# Patient Record
Sex: FEMALE
[De-identification: names, ages, dates, MRNs, and addresses within clinical notes are randomized per-mention and may not be internally consistent; named-entity substitution may affect disease eponyms.]

---

## 2019-05-02 ENCOUNTER — RX RENEWAL (OUTPATIENT)
Age: 29
End: 2019-05-02

## 2021-04-23 ENCOUNTER — EMERGENCY (EMERGENCY)
Facility: HOSPITAL | Age: 31
LOS: 1 days | Discharge: HOME | End: 2021-04-23
Attending: EMERGENCY MEDICINE | Admitting: EMERGENCY MEDICINE
Payer: COMMERCIAL

## 2021-04-23 VITALS
OXYGEN SATURATION: 99 % | DIASTOLIC BLOOD PRESSURE: 68 MMHG | RESPIRATION RATE: 16 BRPM | TEMPERATURE: 98 F | HEART RATE: 80 BPM | SYSTOLIC BLOOD PRESSURE: 110 MMHG

## 2021-04-23 VITALS
HEART RATE: 98 BPM | DIASTOLIC BLOOD PRESSURE: 74 MMHG | SYSTOLIC BLOOD PRESSURE: 103 MMHG | RESPIRATION RATE: 16 BRPM | WEIGHT: 130.07 LBS | OXYGEN SATURATION: 98 % | TEMPERATURE: 98 F

## 2021-04-23 DIAGNOSIS — K59.00 CONSTIPATION, UNSPECIFIED: ICD-10-CM

## 2021-04-23 PROCEDURE — 99284 EMERGENCY DEPT VISIT MOD MDM: CPT

## 2021-04-23 RX ORDER — MULTIVIT WITH MIN/MFOLATE/K2 340-15/3 G
1 POWDER (GRAM) ORAL ONCE
Refills: 0 | Status: COMPLETED | OUTPATIENT
Start: 2021-04-23 | End: 2021-04-23

## 2021-04-23 RX ORDER — LIDOCAINE 4 G/100G
1 CREAM TOPICAL
Qty: 1 | Refills: 0
Start: 2021-04-23 | End: 2021-04-29

## 2021-04-23 RX ADMIN — Medication 1 BOTTLE: at 08:37

## 2021-04-23 RX ADMIN — Medication 1 ENEMA: at 08:37

## 2021-04-23 NOTE — ED PROVIDER NOTE - OBJECTIVE STATEMENT
31 y/o female with no pertinent PMHx who has been having small, hard BM for the past few days. No BM for the past 24 hours with increased "fullness" in rectum and lower abdomen with intermittent cramping. LNMP several days ago. No urinary symptoms. Patient started taking Docusate and Senna with no improvements. Has happened in the past. Denies N/V, fever or chills.

## 2021-04-23 NOTE — ED PROVIDER NOTE - PATIENT PORTAL LINK FT
You can access the FollowMyHealth Patient Portal offered by [facility] by registering at the following website: [email or web address]. By joining [employer]’s FollowMyHealth portal, you will also be able to view your health information using other applications (apps) compatible with our system.

## 2021-04-23 NOTE — ED ADULT TRIAGE NOTE - CHIEF COMPLAINT QUOTE
Pt hasn't had "normal" stool for 3-4 days , been using laxatives but only water coming out, today has pain in rectum and lower left quadrant pain , hx of same years ago. nil other pmhx, nil meds, LMp yesterday

## 2021-04-23 NOTE — ED ADULT NURSE NOTE - OBJECTIVE STATEMENT
Pt states "Spring been constipated for the past 2-3 days and my doctor told me to come ER". Pt added "I took a laxative last night and no feel cramping".

## 2021-04-23 NOTE — ED PROVIDER NOTE - CLINICAL SUMMARY MEDICAL DECISION MAKING FREE TEXT BOX
+ constipation, do not suspect acute abdomen. Given meds, pt preferred to have BM at home. Advised to return if interventions are unsuccessful.

## 2021-04-23 NOTE — ED PROVIDER NOTE - PHYSICAL EXAMINATION
VITAL SIGNS: I have reviewed nursing notes and confirm.  CONSTITUTIONAL: Well-developed; well-nourished; in no acute distress.  SKIN: Skin is warm and dry, no acute rash.  HEAD: Normocephalic; atraumatic.  EYES: PERRL, EOM intact; conjunctiva and sclera clear.  ENT: No nasal discharge; airway clear.  NECK: Supple; non tender.  CARD: S1, S2 normal; no murmurs, gallops, or rubs. Regular rate and rhythm.  RESP: No wheezes, rales or rhonchi.  ABD: +Palpable stool in LLQ. Nontender.   EXT: Normal ROM. No clubbing, cyanosis or edema.  NEURO: Alert, oriented. Grossly unremarkable.  PSYCH: Cooperative, appropriate.

## 2021-08-11 ENCOUNTER — EMERGENCY (EMERGENCY)
Facility: HOSPITAL | Age: 31
LOS: 1 days | Discharge: HOME | End: 2021-08-11
Attending: EMERGENCY MEDICINE | Admitting: EMERGENCY MEDICINE
Payer: COMMERCIAL

## 2021-08-11 VITALS
HEIGHT: 64 IN | DIASTOLIC BLOOD PRESSURE: 70 MMHG | SYSTOLIC BLOOD PRESSURE: 106 MMHG | RESPIRATION RATE: 18 BRPM | OXYGEN SATURATION: 98 % | WEIGHT: 110.01 LBS | HEART RATE: 87 BPM | TEMPERATURE: 98 F

## 2021-08-11 VITALS
OXYGEN SATURATION: 98 % | SYSTOLIC BLOOD PRESSURE: 108 MMHG | RESPIRATION RATE: 18 BRPM | HEART RATE: 84 BPM | TEMPERATURE: 98 F | DIASTOLIC BLOOD PRESSURE: 76 MMHG

## 2021-08-11 DIAGNOSIS — N83.201 UNSPECIFIED OVARIAN CYST, RIGHT SIDE: ICD-10-CM

## 2021-08-11 DIAGNOSIS — R10.31 RIGHT LOWER QUADRANT PAIN: ICD-10-CM

## 2021-08-11 LAB
ALBUMIN SERPL ELPH-MCNC: 3.9 G/DL — SIGNIFICANT CHANGE UP (ref 3.4–5)
ALP SERPL-CCNC: 39 U/L — LOW (ref 40–120)
ALT FLD-CCNC: 18 U/L — SIGNIFICANT CHANGE UP (ref 12–42)
ANION GAP SERPL CALC-SCNC: 4 MMOL/L — LOW (ref 9–16)
AST SERPL-CCNC: 16 U/L — SIGNIFICANT CHANGE UP (ref 15–37)
BASOPHILS # BLD AUTO: 0.06 K/UL — SIGNIFICANT CHANGE UP (ref 0–0.2)
BASOPHILS NFR BLD AUTO: 0.8 % — SIGNIFICANT CHANGE UP (ref 0–2)
BILIRUB SERPL-MCNC: 0.2 MG/DL — SIGNIFICANT CHANGE UP (ref 0.2–1.2)
BUN SERPL-MCNC: 8 MG/DL — SIGNIFICANT CHANGE UP (ref 7–23)
CALCIUM SERPL-MCNC: 9 MG/DL — SIGNIFICANT CHANGE UP (ref 8.5–10.5)
CHLORIDE SERPL-SCNC: 109 MMOL/L — HIGH (ref 96–108)
CO2 SERPL-SCNC: 30 MMOL/L — SIGNIFICANT CHANGE UP (ref 22–31)
CREAT SERPL-MCNC: 0.95 MG/DL — SIGNIFICANT CHANGE UP (ref 0.5–1.3)
EOSINOPHIL # BLD AUTO: 0.17 K/UL — SIGNIFICANT CHANGE UP (ref 0–0.5)
EOSINOPHIL NFR BLD AUTO: 2.3 % — SIGNIFICANT CHANGE UP (ref 0–6)
GLUCOSE SERPL-MCNC: 90 MG/DL — SIGNIFICANT CHANGE UP (ref 70–99)
HCG SERPL-ACNC: 1 MIU/ML — SIGNIFICANT CHANGE UP
HCT VFR BLD CALC: 39.9 % — SIGNIFICANT CHANGE UP (ref 34.5–45)
HGB BLD-MCNC: 13.4 G/DL — SIGNIFICANT CHANGE UP (ref 11.5–15.5)
IMM GRANULOCYTES NFR BLD AUTO: 0.4 % — SIGNIFICANT CHANGE UP (ref 0–1.5)
LIDOCAIN IGE QN: 181 U/L — SIGNIFICANT CHANGE UP (ref 73–393)
LYMPHOCYTES # BLD AUTO: 2.43 K/UL — SIGNIFICANT CHANGE UP (ref 1–3.3)
LYMPHOCYTES # BLD AUTO: 33 % — SIGNIFICANT CHANGE UP (ref 13–44)
MCHC RBC-ENTMCNC: 32 PG — SIGNIFICANT CHANGE UP (ref 27–34)
MCHC RBC-ENTMCNC: 33.6 GM/DL — SIGNIFICANT CHANGE UP (ref 32–36)
MCV RBC AUTO: 95.2 FL — SIGNIFICANT CHANGE UP (ref 80–100)
MONOCYTES # BLD AUTO: 0.56 K/UL — SIGNIFICANT CHANGE UP (ref 0–0.9)
MONOCYTES NFR BLD AUTO: 7.6 % — SIGNIFICANT CHANGE UP (ref 2–14)
NEUTROPHILS # BLD AUTO: 4.11 K/UL — SIGNIFICANT CHANGE UP (ref 1.8–7.4)
NEUTROPHILS NFR BLD AUTO: 55.9 % — SIGNIFICANT CHANGE UP (ref 43–77)
NRBC # BLD: 0 /100 WBCS — SIGNIFICANT CHANGE UP (ref 0–0)
PLATELET # BLD AUTO: 227 K/UL — SIGNIFICANT CHANGE UP (ref 150–400)
POTASSIUM SERPL-MCNC: 4.3 MMOL/L — SIGNIFICANT CHANGE UP (ref 3.5–5.3)
POTASSIUM SERPL-SCNC: 4.3 MMOL/L — SIGNIFICANT CHANGE UP (ref 3.5–5.3)
PROT SERPL-MCNC: 7.3 G/DL — SIGNIFICANT CHANGE UP (ref 6.4–8.2)
RBC # BLD: 4.19 M/UL — SIGNIFICANT CHANGE UP (ref 3.8–5.2)
RBC # FLD: 12.2 % — SIGNIFICANT CHANGE UP (ref 10.3–14.5)
SODIUM SERPL-SCNC: 143 MMOL/L — SIGNIFICANT CHANGE UP (ref 132–145)
WBC # BLD: 7.36 K/UL — SIGNIFICANT CHANGE UP (ref 3.8–10.5)
WBC # FLD AUTO: 7.36 K/UL — SIGNIFICANT CHANGE UP (ref 3.8–10.5)

## 2021-08-11 PROCEDURE — 76856 US EXAM PELVIC COMPLETE: CPT | Mod: 26

## 2021-08-11 PROCEDURE — 76830 TRANSVAGINAL US NON-OB: CPT | Mod: 26

## 2021-08-11 PROCEDURE — 99285 EMERGENCY DEPT VISIT HI MDM: CPT

## 2021-08-11 RX ORDER — ONDANSETRON 8 MG/1
4 TABLET, FILM COATED ORAL ONCE
Refills: 0 | Status: COMPLETED | OUTPATIENT
Start: 2021-08-11 | End: 2021-08-11

## 2021-08-11 RX ORDER — FAMOTIDINE 10 MG/ML
20 INJECTION INTRAVENOUS ONCE
Refills: 0 | Status: COMPLETED | OUTPATIENT
Start: 2021-08-11 | End: 2021-08-11

## 2021-08-11 RX ORDER — IOHEXOL 300 MG/ML
30 INJECTION, SOLUTION INTRAVENOUS ONCE
Refills: 0 | Status: COMPLETED | OUTPATIENT
Start: 2021-08-11 | End: 2021-08-11

## 2021-08-11 RX ORDER — SODIUM CHLORIDE 9 MG/ML
1000 INJECTION INTRAMUSCULAR; INTRAVENOUS; SUBCUTANEOUS ONCE
Refills: 0 | Status: COMPLETED | OUTPATIENT
Start: 2021-08-11 | End: 2021-08-11

## 2021-08-11 RX ORDER — KETOROLAC TROMETHAMINE 30 MG/ML
15 SYRINGE (ML) INJECTION ONCE
Refills: 0 | Status: DISCONTINUED | OUTPATIENT
Start: 2021-08-11 | End: 2021-08-11

## 2021-08-11 RX ORDER — MORPHINE SULFATE 50 MG/1
2 CAPSULE, EXTENDED RELEASE ORAL ONCE
Refills: 0 | Status: DISCONTINUED | OUTPATIENT
Start: 2021-08-11 | End: 2021-08-11

## 2021-08-11 RX ADMIN — IOHEXOL 30 MILLILITER(S): 300 INJECTION, SOLUTION INTRAVENOUS at 05:41

## 2021-08-11 RX ADMIN — SODIUM CHLORIDE 1000 MILLILITER(S): 9 INJECTION INTRAMUSCULAR; INTRAVENOUS; SUBCUTANEOUS at 06:41

## 2021-08-11 RX ADMIN — Medication 15 MILLIGRAM(S): at 06:30

## 2021-08-11 RX ADMIN — FAMOTIDINE 20 MILLIGRAM(S): 10 INJECTION INTRAVENOUS at 05:41

## 2021-08-11 RX ADMIN — ONDANSETRON 4 MILLIGRAM(S): 8 TABLET, FILM COATED ORAL at 05:41

## 2021-08-11 RX ADMIN — Medication 15 MILLIGRAM(S): at 05:41

## 2021-08-11 RX ADMIN — SODIUM CHLORIDE 1000 MILLILITER(S): 9 INJECTION INTRAMUSCULAR; INTRAVENOUS; SUBCUTANEOUS at 05:41

## 2021-08-11 NOTE — ED PROVIDER NOTE - PHYSICAL EXAMINATION
VITAL SIGNS: I have reviewed nursing notes and confirm.  CONSTITUTIONAL: Well-developed; well-nourished; in no acute distress.  SKIN: Skin exam is warm and dry, no acute rash.  HEAD: Normocephalic; atraumatic.  EYES: PERRL, EOM intact; conjunctiva and sclera clear.  ENT: No nasal discharge; airway clear.  NECK: Supple; non tender.  CARD: S1, S2 normal; no murmurs, gallops, or rubs. Regular rate and rhythm.  RESP: Unlabored. No wheezes, rales or rhonchi.  ABD: soft; non-distended; + RLQ TTP w/out rigidity/guarding  : No CVA TTP b/l  EXT: Normal ROM. No cyanosis or edema. Non-ttp all ext, distal pulses intact  NEURO: Alert, oriented. Grossly unremarkable.  PSYCH: Cooperative, appropriate.

## 2021-08-11 NOTE — ED PROVIDER NOTE - OBJECTIVE STATEMENT
32 y/o F w/hx constipation p/w acute onset RLQ sharp abd pain, non-radiating with nausea, no vomiting. Normal BM yesterday. No back/flank pain. no urinary sx. LMP 4 days ago, no active vaginal bleeding or discharge. No fever/chills. No similar pain in the past. No abd surgeries.

## 2021-08-11 NOTE — ED PROVIDER NOTE - PATIENT PORTAL LINK FT
You can access the FollowMyHealth Patient Portal offered by [facility] by registering at the following website: [email or web address]. By joining [employer]’s FollowMyHealth portal, you will also be able to view your health information using other applications (apps) compatible with our system.

## 2021-08-11 NOTE — ED PROVIDER NOTE - PROGRESS NOTE DETAILS
Patient handed off to me by Dr. Moon, pending results of Ultrasound. Small ovarian ruptured cyst. Discharge. Advised to f/u with OB GYN and take NSAIDS.

## 2021-08-11 NOTE — ED PROVIDER NOTE - NSFOLLOWUPINSTRUCTIONS_ED_ALL_ED_FT
OVARIAN CYST - AfterCare(R) Instructions(ER/ED)           Ovarian Cyst    WHAT YOU NEED TO KNOW:    An ovarian cyst is a fluid-filled sac that grows in or on an ovary. You have 2 ovaries, 1 on each side of your uterus. They are small, about the shape of an almond. Ovarian cysts are common in women who have regular monthly cycles. During your monthly cycle, eggs are released from the ovaries. The cyst usually contains fluid but may sometimes have blood or tissue in it. Most ovarian cysts are harmless and go away without treatment in a few months. Some cysts can grow large, cause pain, or break open.     Female Reproductive System         DISCHARGE INSTRUCTIONS:    Call your local emergency number (911 in the [place]) if:   •You have severe pain with fever and vomiting.      •You have sudden, severe abdominal pain.      •You are too weak, faint, or dizzy to stand up.      •You are breathing very quickly.      Call your doctor or gynecologist if:   •Your periods are early, late, or more painful than usual.      •You have questions or concerns about your condition or care.      Medicines: You may need any of the following:   •Birth control pills may help control your monthly cycle, prevent cysts, or cause them to shrink.      •Acetaminophen decreases pain and fever. It is available without a doctor's order. Ask how much to take and how often to take it. Follow directions. Read the labels of all other medicines you are using to see if they also contain acetaminophen, or ask your doctor or pharmacist. Acetaminophen can cause liver damage if not taken correctly. Do not use more than 4 grams (4,000 milligrams) total of acetaminophen in one day.       •NSAIDs, such as ibuprofen, help decrease swelling, pain, and fever. This medicine is available with or without a doctor's order. NSAIDs can cause stomach bleeding or kidney problems in certain people. If you take blood thinner medicine, always ask your healthcare provider if NSAIDs are safe for you. Always read the medicine label and follow directions.      •Prescription pain medicine may be given. Ask your healthcare provider how to take this medicine safely. Some prescription pain medicines contain acetaminophen. Do not take other medicines that contain acetaminophen without talking to your healthcare provider. Too much acetaminophen may cause liver damage. Prescription pain medicine may cause constipation. Ask your healthcare provider how to prevent or treat constipation.       •Take your medicine as directed. Contact your healthcare provider if you think your medicine is not helping or if you have side effects. Tell him or her if you are allergic to any medicine. Keep a list of the medicines, vitamins, and herbs you take. Include the amounts, and when and why you take them. Bring the list or the pill bottles to follow-up visits. Carry your medicine list with you in case of an emergency.      Manage ovarian cysts: You can manage a current cyst and help healthcare providers find future cysts early.  •Apply heat to decrease pain and cramping from a cyst. Sit in a warm bath, or place a heating pad (turned on low) on your abdomen. Do this for 15 to 20 minutes every hour for comfort.      •Get regular pelvic exams or Pap smears. This will help providers find any new ovarian cysts. Tell your healthcare provider about any unusual changes in your monthly cycle.      Follow up with your doctor or gynecologist as directed: Write down your questions so you remember to ask them during your visits.       © Copyright [employer] 2021           back to top                          © Copyright [employer] 2021

## 2021-08-11 NOTE — ED PROVIDER NOTE - CLINICAL SUMMARY MEDICAL DECISION MAKING FREE TEXT BOX
labs unremarkable, concerned for possible ovarian pathology, pending u/s, drinking for CT abd/pel should u/s be equivocal. pain well controlled, VSS in ED.

## 2022-04-21 NOTE — ED PROVIDER NOTE - NSTIMEPROVIDERCAREINITIATE_GEN_ER
Coleen Rajan is a 8year old female who presents for a telehealth visit via live interactive video with a secure connection. This visit was not recorded or stored. Provider location: home  Patient Location: home in the [place] [facility] she was accompanied by mother    Consent for telehealth visit was verified with the parent/guardian, including risk of electronic data, possibility of equipment failure or need for in person visit if condition cannot be fully assessed by telehealth. Parent/guardian was also informed that consent to treat includes permission to submit charges to the patient's insurance. Reason for visit: routine follow up  and medication management  Reason for use of telehealth: patient/parent preference    Pediatric Endocrinology Diabetes Visit Note    Interval History (HPI):  Tona Villa is a 8year old 4 month old with Type 1 diabetes. Date of diagnosis: 7/2019    Questions/Concerns:  She is requiring much less insulin this week after getting over her recent illness. Mom states she is very sensitive to insulin. For lunch she ate 68 carbs and got 3 units (1:22) and her BG is 178 mg/dl. Illness since last visit:  Yes last week, she woke up vomiting bile and was nauseous. Vomit x1. BGs were elevated 2-3 days prior. Mom did not check ketones. Mom could not find the strips. Will get refill. When they got back from [place] at the end of March she had double ear infections and was on antibiotics. Since this, Tona Villa is very sensitive to insulin.      Refills: yes    CSII:  Pump/Pod: omnipod  Who gives boluses: patient does most of the injections or boluses   When do you give boluses: before meals and snacks   How many boluses do you miss a week:0-1  Pump site locations:arms and lower back  Rotating pump sites: yes  Problems with pump sites or pump: No   Do you have gloria to upload your pump to the cloud: Yes  Site change every 3 days  Active insulin time: 2 hours    Pump upload attached to visit: yes, but she is looping so the report doesn't show much      Stats below are not available on her looping system. Total daily dose: Total basal dose:   Percent basal:   Units/kg/day of insulin:     Delta Fahad     Pump Instructions    Pump type  Omnipod   Rapid acting insulin Novolog U100   BASAL RATE    Time U/Hr   0000 0.4   0700 0.35   0900 0.4   1100 0.25   1200 0.2   2000 0.4   2200 0.4   2230 0.4         INSULIN TO CARB RATIO   Time 1 u/ __ gm    0000 1 u/ 12 gm    0700 1 u/ 7 gm    0900 1 u/ 10 gm    1100 1 u/ 10 gm    1400  1 u/ 10 gm    1700 1 u/ 10 gm    CORRECTION FACTOR (CF)   Time  CF     75         TARGET BLOOD SUGAR   Time __ mg/dL   0000 110 mg/dL       mg/dL     Blood Glucose range/average: see attached log and/or CGM report    Glucose Monitoring:  Meter brand or model:contour next  How many BG checks per day: 1    CGM: yes, Dexcom  Do you manually check BG? Yes, when? when she has very low or very high BG      Hypoglycemia:  Frequency per week (less than 80): 10  How many of those are overnight: 1  Hypoglycemia awareness: Yes  Hypoglycemia symptoms: Sleepiness and Tremors   Glucagon at home and school: Yes  Use glucagon since last visit: No    Ketones:  Do you check for ketones: Checks  Ketones since last visit: Mom can't find the Ketostix  Method: urine ketone strips    Safety:   Hospitalizations since last visit: No  Medical Alert ID: No    Nutrition:   Do you eat regular meals: breakfast, lunch and dinner  Do you snack: mid-a.m., afternoon and bedtime  Carb counting concerns: No    Other:   Job, sports or extracurricular activities: Yes, Soccer, basketball and dance.  Every day she has one or two sports  Problems related to diabetes at school: No     PAST MEDICAL HISTORY:  Past Medical History:   Diagnosis Date   â¢ Diabetes mellitus (CMS/HCC)        PAST SURGICAL HISTORY:  Past Surgical History:   Procedure Laterality Date   â¢ No past surgeries         FAMILY HISTORY:  Family History   Problem Relation Age of Onset   â¢ Patient is unaware of any medical problems Mother    â¢ Patient is unaware of any medical problems Father    â¢ Patient is unaware of any medical problems Maternal Grandmother    â¢ Patient is unaware of any medical problems Maternal Grandfather    â¢ Patient is unaware of any medical problems Paternal Grandmother    â¢ Patient is unaware of any medical problems Paternal Grandfather    â¢ Patient is unaware of any medical problems Sister    â¢ Patient is unaware of any medical problems Sister        SOCIAL HISTORY:  Social History     Tobacco Use   â¢ Smoking status: Never Smoker   â¢ Smokeless tobacco: Never Used   Substance Use Topics   â¢ Alcohol use: Not on file   â¢ Drug use: Not on file     Social History     Social History Narrative        Lives at home with parentes and 2 younger sisters. Review of Systems   Constitutional: Negative for activity change, appetite change, fatigue and fever. HENT: Negative for congestion and rhinorrhea. Eyes: Negative for visual disturbance. Respiratory: Negative for cough and shortness of breath. Cardiovascular: Negative for palpitations. Gastrointestinal: Negative for abdominal pain, constipation and diarrhea. Endocrine: Negative for cold intolerance, heat intolerance, polydipsia and polyuria. Genitourinary: Negative for dysuria. Musculoskeletal: Negative for myalgias. Skin: Negative for rash. Neurological: Negative for headaches. Psychiatric/Behavioral: Negative for sleep disturbance. PHYSICAL EXAMINATION  There were no vitals taken for this visit. No weight on file for this encounter. No height on file for this encounter. There is no height or weight on file to calculate BSA. No blood pressure reading on file for this encounter. BMI: No height and weight on file for this encounter. Physical Exam  Constitutional: Appears well-developed and well-nourished. Active. No distress.    HENT: Conjunctivae and EOM are normal. Moist lips. Neck: no visible thyromegaly. Pulmonary/Chest: Effort normal. No increased work of breathing. Abdomen: No central adiposity. Musculoskeletal: Normal range of motion, no edema. Neuro: alert, oriented. Psych: normal mood and affect    Lab and Imaging Results  Hemoglobin A1C: No results found for this visit on 04/21/22.     Recent Results (from the past 8400 hour(s))   GLYCOHEMOGLOBIN    Collection Time: 06/14/21  2:23 PM   Result Value Ref Range    Hemoglobin A1C 6.9 (H) 4.5 - 5.6 %   COMPREHENSIVE METABOLIC PANEL    Collection Time: 09/13/21  8:07 AM   Result Value Ref Range    Fasting Status 12 >0 - 999 Hours    Sodium 140 135 - 145 mmol/L    Potassium 4.4 3.4 - 5.1 mmol/L    Chloride 108 (H) 98 - 107 mmol/L    Carbon Dioxide 25 21 - 32 mmol/L    Anion Gap 11 10 - 20 mmol/L    Glucose 102 (H) 65 - 99 mg/dL    BUN 15 5 - 18 mg/dL    Creatinine 0.69 (H) 0.21 - 0.65 mg/dL    Glomerular Filtration Rate      BUN/ Creatinine Ratio 22 7 - 25    Calcium 9.6 8.0 - 11.0 mg/dL    Bilirubin, Total 0.6 0.2 - 1.4 mg/dL    GOT/AST 22 10 - 45 Units/L    GPT/ALT 25 10 - 30 Units/L    Alkaline Phosphatase 292 150 - 360 Units/L    Albumin 4.3 3.6 - 5.1 g/dL    Protein, Total 7.5 6.0 - 8.0 g/dL    Globulin 3.2 2.0 - 4.0 g/dL    A/G Ratio 1.3 1.0 - 2.4   FREE T4    Collection Time: 09/13/21  8:07 AM   Result Value Ref Range    T4, Free 1.2 0.8 - 1.4 ng/dL   CELIAC DISEASE SCR 2Y AND OVER    Collection Time: 09/13/21  8:07 AM   Result Value Ref Range    Immunoglobulin A 117 51 - 297 mg/dL    Tissue Transglutaminase Antibody, IgA 5 <20 Units   LIPID PANEL WITHOUT REFLEX    Collection Time: 09/13/21  8:07 AM   Result Value Ref Range    Fasting Status 12 >0 - 999 Hours    Cholesterol 158 <=169 mg/dL    Triglycerides 37 <=74 mg/dL    HDL 73 >=46 mg/dL    LDL 78 <=109 mg/dL    Non-HDL Cholesterol 85 <=119 mg/dL    Cholesterol/ HDL Ratio 2.2 <=4.4   THYROID STIMULATING HORMONE    Collection Time: 09/13/21  8:07 AM   Result Value Ref Range    TSH 1.606 0.662 - 4.010 mcUnits/mL   GLYCOHEMOGLOBIN    Collection Time: 09/13/21  8:07 AM   Result Value Ref Range    Hemoglobin A1C 7.0 (H) 4.5 - 5.6 %   CBC WITH AUTOMATED DIFFERENTIAL (PERFORMABLE ONLY)    Collection Time: 09/13/21  8:07 AM   Result Value Ref Range    WBC 4.9 (L) 5.0 - 14.5 K/mcL    RBC 4.77 3.90 - 5.30 mil/mcL    HGB 13.0 11.5 - 15.5 g/dL    HCT 39.3 35.0 - 45.0 %    MCV 82.4 77.0 - 95.0 fl    MCH 27.3 25.0 - 33.0 pg    MCHC 33.1 31.0 - 37.0 g/dL    RDW-CV 12.6 11.0 - 15.0 %    RDW-SD 38.1 35.0 - 47.0 fL     140 - 450 K/mcL    NRBC 0 <=0 /100 WBC    Neutrophil, Percent 44 %    Lymphocytes, Percent 43 %    Mono, Percent 11 %    Eosinophils, Percent 1 %    Basophils, Percent 1 %    Immature Granulocytes 0 %    Absolute Neutrophils 2.1 1.5 - 8.0 K/mcL    Absolute Lymphocytes 2.1 1.5 - 6.8 K/mcL    Absolute Monocytes 0.5 0.0 - 0.8 K/mcL    Absolute Eosinophils  0.1 0.0 - 0.5 K/mcL    Absolute Basophils 0.1 0.0 - 0.2 K/mcL    Absolute Immmature Granulocytes 0.0 0.0 - 0.2 K/mcL   MICROALBUMIN URINE RANDOM    Collection Time: 09/13/21  8:50 AM   Result Value Ref Range    Microalbumin, Urine 1.76 mg/dL    Creatinine, Urine 194.00 mg/dL    Microalbumin/ Creatinine Ratio 9.1 <30.0 mg/g   POCT GLYCOHEMOGLOBIN ANALYZER    Collection Time: 01/21/22 12:00 AM   Result Value Ref Range    Hemoglobin A1C, POC 7.0 (A) 4.5 - 5.6 %       ASSESSMENT:   1. Type 1 diabetes mellitus without complication (CMS/[facility])      Type 1 diabetes in good control. Family is adhering to insulin regimen appropriately. Discussed CGM data in detail. CGM Report:  Continuous glucose monitoring data from the past 2 weeks was reviewed and discussed with family. The average BG was 157 mg/dl. Patient spent 69% of time in range. They spent 28% of time above target and 3% of time below target. There is a pattern of overall normoglycemia.       Education: Peds Endo Diabetes Education: Insulin dose adjustments were discussed today. PLAN:   1. Follow-up in pediatric endocrine clinic in 3 months  2. Insulin changes and lab tests as planned at clinic visit per instructions. 3.  Monitor blood sugars pre-meals, pre-snack, bedtime and overnight. If on CGM check BG to calibrate and verify high or low blood sugars. 4.  Communicate blood sugar/ carbohydrate and insulin log to the office with instructions provided.  Download CGM sensor data for review in 12 weeks     Goal for next visit: continued good DM control    Referral for education: none     Gloria Stern MD 11-Aug-2021 05:02

## 2024-08-09 NOTE — ED ADULT TRIAGE NOTE - HEIGHT IN CM
[FreeTextEntry8] : minimal cerumen removed via curettage  [FreeTextEntry9] : minimal cerumen removed via curettage  [de-identified] : mildly inflamed turbinates and minimally deviated septum  [de-identified] : no bleeding sites  162.56 [Midline] : trachea located in midline position [Removed] : palatine tonsils previously removed [Normal] : no rashes

## 2024-12-10 NOTE — ED ADULT NURSE NOTE - DOES PATIENT HAVE ADVANCE DIRECTIVE
This RN spoke with Promise from [facility] ([facility]) and pre-op appointment is scheduled for 12-13-24 is RN informed Promise to fax over H&P and medical clearance letter to [phone]. Promise confirmed with verbal understanding.    No

## 2025-05-26 NOTE — ED PROVIDER NOTE - IV ALTEPLASE INCLUSION HIDDEN
Nursing  Group Note        Date: May 26, 2025 Start Time:  1330   End Time:  1450      Number of Participants in Group & Unit Census:  4/7    Topic: To color and construct a poppy flower in remembrance of veterans since it is Memorial Day.     Goal of Group:[facility] Day Group Activity      Comments:     Patient did not participate in  Nursing  group, despite staff encouragement and explanation of benefits.  Patient remain seclusive to self.  Q15 minute safety checks maintained for patient safety and will continue to encourage patient to attend unit programming.      show